# Patient Record
Sex: MALE | Race: WHITE | Employment: OTHER | URBAN - METROPOLITAN AREA
[De-identification: names, ages, dates, MRNs, and addresses within clinical notes are randomized per-mention and may not be internally consistent; named-entity substitution may affect disease eponyms.]

---

## 2017-04-04 PROBLEM — E79.0 HYPERURICEMIA: Status: ACTIVE | Noted: 2017-04-04

## 2017-04-04 PROBLEM — M76.62 ACHILLES TENDONITIS, BILATERAL: Status: ACTIVE | Noted: 2017-04-04

## 2017-04-04 PROBLEM — G60.3 IDIOPATHIC PROGRESSIVE NEUROPATHY: Status: ACTIVE | Noted: 2017-04-04

## 2017-04-04 PROBLEM — M76.61 ACHILLES TENDONITIS, BILATERAL: Status: ACTIVE | Noted: 2017-04-04

## 2017-04-04 PROCEDURE — 82746 ASSAY OF FOLIC ACID SERUM: CPT | Performed by: INTERNAL MEDICINE

## 2017-04-04 PROCEDURE — 82607 VITAMIN B-12: CPT | Performed by: INTERNAL MEDICINE

## 2017-07-19 ENCOUNTER — LAB ENCOUNTER (OUTPATIENT)
Dept: LAB | Age: 82
End: 2017-07-19
Attending: PODIATRIST
Payer: MEDICARE

## 2017-07-19 DIAGNOSIS — E11.65 TYPE 2 DIABETES MELLITUS, UNCONTROLLED (HCC): Primary | ICD-10-CM

## 2017-07-19 PROCEDURE — 83036 HEMOGLOBIN GLYCOSYLATED A1C: CPT

## 2017-07-20 LAB
EST. AVERAGE GLUCOSE BLD GHB EST-MCNC: 114 MG/DL (ref 68–126)
HBA1C MFR BLD HPLC: 5.6 % (ref ?–5.7)

## 2017-08-10 PROBLEM — J43.8 OTHER EMPHYSEMA (HCC): Status: ACTIVE | Noted: 2017-08-10

## 2017-08-10 PROBLEM — J30.1 ACUTE SEASONAL ALLERGIC RHINITIS DUE TO POLLEN: Status: ACTIVE | Noted: 2017-08-10

## 2017-08-10 PROCEDURE — 81003 URINALYSIS AUTO W/O SCOPE: CPT | Performed by: INTERNAL MEDICINE

## 2017-08-18 ENCOUNTER — MYAURORA ACCOUNT LINK (OUTPATIENT)
Dept: OTHER | Age: 82
End: 2017-08-18

## 2017-08-21 ENCOUNTER — LAB REQUISITION (OUTPATIENT)
Dept: LAB | Facility: HOSPITAL | Age: 82
End: 2017-08-21
Payer: MEDICARE

## 2017-08-21 DIAGNOSIS — M21.271 FLEXION DEFORMITY, RIGHT ANKLE AND TOES: ICD-10-CM

## 2017-08-21 DIAGNOSIS — M20.41 OTHER HAMMER TOE(S) (ACQUIRED), RIGHT FOOT: ICD-10-CM

## 2017-08-21 PROCEDURE — 88305 TISSUE EXAM BY PATHOLOGIST: CPT | Performed by: PODIATRIST

## 2017-11-20 PROBLEM — E78.00 PURE HYPERCHOLESTEROLEMIA: Status: ACTIVE | Noted: 2017-11-20

## 2017-11-20 PROBLEM — F32.A MILD DEPRESSIVE DISORDER: Status: ACTIVE | Noted: 2017-11-20

## 2018-12-03 PROCEDURE — 81003 URINALYSIS AUTO W/O SCOPE: CPT | Performed by: INTERNAL MEDICINE

## 2018-12-17 PROBLEM — I65.23 BILATERAL CAROTID ARTERY STENOSIS: Status: ACTIVE | Noted: 2018-12-17

## 2021-01-21 PROBLEM — M76.61 ACHILLES TENDONITIS, BILATERAL: Status: RESOLVED | Noted: 2017-04-04 | Resolved: 2021-01-21

## 2021-01-21 PROBLEM — M76.62 ACHILLES TENDONITIS, BILATERAL: Status: RESOLVED | Noted: 2017-04-04 | Resolved: 2021-01-21

## 2021-08-31 PROBLEM — R26.9 GAIT ABNORMALITY: Status: ACTIVE | Noted: 2021-08-31

## 2021-08-31 PROBLEM — R29.6 FREQUENT FALLS: Status: ACTIVE | Noted: 2021-08-31

## 2022-03-22 PROBLEM — F32.A MILD DEPRESSIVE DISORDER: Status: RESOLVED | Noted: 2017-11-20 | Resolved: 2022-03-22

## 2022-03-22 PROBLEM — F32.0 MAJOR DEPRESSIVE DISORDER, SINGLE EPISODE, MILD (HCC): Status: ACTIVE | Noted: 2022-03-22

## 2022-03-22 PROBLEM — E46 PROTEIN-CALORIE MALNUTRITION, UNSPECIFIED SEVERITY (HCC): Status: ACTIVE | Noted: 2022-03-22

## 2022-03-23 NOTE — PAT NURSING NOTE
Left voicemail message for Kel Aldridge at surgeon office (Dr. Frank Gilbert) to verify that there is a plan in place for 24 hour H&P update prior to procedure tomorrow. Await return call.

## 2022-03-24 ENCOUNTER — ANESTHESIA EVENT (OUTPATIENT)
Dept: SURGERY | Facility: HOSPITAL | Age: 87
End: 2022-03-24
Payer: MEDICARE

## 2022-03-24 ENCOUNTER — HOSPITAL ENCOUNTER (OUTPATIENT)
Facility: HOSPITAL | Age: 87
Setting detail: HOSPITAL OUTPATIENT SURGERY
Discharge: HOME | End: 2022-03-24
Attending: PODIATRIST | Admitting: PODIATRIST
Payer: MEDICARE

## 2022-03-24 ENCOUNTER — APPOINTMENT (OUTPATIENT)
Dept: GENERAL RADIOLOGY | Facility: HOSPITAL | Age: 87
End: 2022-03-24
Attending: PODIATRIST
Payer: MEDICARE

## 2022-03-24 ENCOUNTER — ANESTHESIA (OUTPATIENT)
Dept: SURGERY | Facility: HOSPITAL | Age: 87
End: 2022-03-24
Payer: MEDICARE

## 2022-03-24 VITALS
BODY MASS INDEX: 19.99 KG/M2 | HEIGHT: 69 IN | DIASTOLIC BLOOD PRESSURE: 67 MMHG | SYSTOLIC BLOOD PRESSURE: 128 MMHG | OXYGEN SATURATION: 96 % | HEART RATE: 89 BPM | RESPIRATION RATE: 18 BRPM | TEMPERATURE: 97 F | WEIGHT: 135 LBS

## 2022-03-24 DIAGNOSIS — M86.9 OSTEOMYELITIS OF GREAT TOE OF LEFT FOOT (HCC): ICD-10-CM

## 2022-03-24 DIAGNOSIS — M86.9 OSTEOMYELITIS OF GREAT TOE OF RIGHT FOOT (HCC): ICD-10-CM

## 2022-03-24 LAB — GLUCOSE BLD-MCNC: 94 MG/DL (ref 70–99)

## 2022-03-24 PROCEDURE — 82962 GLUCOSE BLOOD TEST: CPT

## 2022-03-24 PROCEDURE — 87205 SMEAR GRAM STAIN: CPT | Performed by: PODIATRIST

## 2022-03-24 PROCEDURE — 73630 X-RAY EXAM OF FOOT: CPT | Performed by: PODIATRIST

## 2022-03-24 PROCEDURE — 87075 CULTR BACTERIA EXCEPT BLOOD: CPT | Performed by: PODIATRIST

## 2022-03-24 PROCEDURE — 87186 SC STD MICRODIL/AGAR DIL: CPT | Performed by: PODIATRIST

## 2022-03-24 PROCEDURE — 0Y6Q0Z1 DETACHMENT AT LEFT 1ST TOE, HIGH, OPEN APPROACH: ICD-10-PCS | Performed by: PODIATRIST

## 2022-03-24 PROCEDURE — 87070 CULTURE OTHR SPECIMN AEROBIC: CPT | Performed by: PODIATRIST

## 2022-03-24 PROCEDURE — 87077 CULTURE AEROBIC IDENTIFY: CPT | Performed by: PODIATRIST

## 2022-03-24 PROCEDURE — 87176 TISSUE HOMOGENIZATION CULTR: CPT | Performed by: PODIATRIST

## 2022-03-24 PROCEDURE — 88311 DECALCIFY TISSUE: CPT | Performed by: PODIATRIST

## 2022-03-24 PROCEDURE — 0Y6P0Z1 DETACHMENT AT RIGHT 1ST TOE, HIGH, OPEN APPROACH: ICD-10-PCS | Performed by: PODIATRIST

## 2022-03-24 PROCEDURE — 88305 TISSUE EXAM BY PATHOLOGIST: CPT | Performed by: PODIATRIST

## 2022-03-24 RX ORDER — METOCLOPRAMIDE HYDROCHLORIDE 5 MG/ML
10 INJECTION INTRAMUSCULAR; INTRAVENOUS AS NEEDED
Status: DISCONTINUED | OUTPATIENT
Start: 2022-03-24 | End: 2022-03-24

## 2022-03-24 RX ORDER — SULFAMETHOXAZOLE AND TRIMETHOPRIM 800; 160 MG/1; MG/1
1 TABLET ORAL 2 TIMES DAILY
Qty: 20 TABLET | Refills: 0 | Status: SHIPPED | OUTPATIENT
Start: 2022-03-24 | End: 2022-04-03

## 2022-03-24 RX ORDER — ACETAMINOPHEN 500 MG
1000 TABLET ORAL ONCE
Status: DISCONTINUED | OUTPATIENT
Start: 2022-03-24 | End: 2022-03-24 | Stop reason: HOSPADM

## 2022-03-24 RX ORDER — BUPIVACAINE HYDROCHLORIDE 5 MG/ML
INJECTION, SOLUTION EPIDURAL; INTRACAUDAL AS NEEDED
Status: DISCONTINUED | OUTPATIENT
Start: 2022-03-24 | End: 2022-03-24 | Stop reason: HOSPADM

## 2022-03-24 RX ORDER — IBUPROFEN 200 MG
600 TABLET ORAL ONCE AS NEEDED
Status: DISCONTINUED | OUTPATIENT
Start: 2022-03-24 | End: 2022-03-24

## 2022-03-24 RX ORDER — LIDOCAINE HYDROCHLORIDE 10 MG/ML
INJECTION, SOLUTION INFILTRATION; PERINEURAL AS NEEDED
Status: DISCONTINUED | OUTPATIENT
Start: 2022-03-24 | End: 2022-03-24 | Stop reason: HOSPADM

## 2022-03-24 RX ORDER — PHENYLEPHRINE HCL 10 MG/ML
VIAL (ML) INJECTION AS NEEDED
Status: DISCONTINUED | OUTPATIENT
Start: 2022-03-24 | End: 2022-03-24 | Stop reason: SURG

## 2022-03-24 RX ORDER — VANCOMYCIN HYDROCHLORIDE 1 G/20ML
INJECTION, POWDER, LYOPHILIZED, FOR SOLUTION INTRAVENOUS
Status: DISCONTINUED
Start: 2022-03-24 | End: 2022-03-24

## 2022-03-24 RX ORDER — HYDROCODONE BITARTRATE AND ACETAMINOPHEN 5; 325 MG/1; MG/1
1 TABLET ORAL AS NEEDED
Status: DISCONTINUED | OUTPATIENT
Start: 2022-03-24 | End: 2022-03-24

## 2022-03-24 RX ORDER — SODIUM CHLORIDE, SODIUM LACTATE, POTASSIUM CHLORIDE, CALCIUM CHLORIDE 600; 310; 30; 20 MG/100ML; MG/100ML; MG/100ML; MG/100ML
INJECTION, SOLUTION INTRAVENOUS CONTINUOUS
Status: DISCONTINUED | OUTPATIENT
Start: 2022-03-24 | End: 2022-03-24

## 2022-03-24 RX ORDER — HYDROCODONE BITARTRATE AND ACETAMINOPHEN 5; 325 MG/1; MG/1
2 TABLET ORAL AS NEEDED
Status: DISCONTINUED | OUTPATIENT
Start: 2022-03-24 | End: 2022-03-24

## 2022-03-24 RX ORDER — CEFAZOLIN SODIUM/WATER 2 G/20 ML
2 SYRINGE (ML) INTRAVENOUS ONCE
Status: COMPLETED | OUTPATIENT
Start: 2022-03-24 | End: 2022-03-24

## 2022-03-24 RX ORDER — NALOXONE HYDROCHLORIDE 0.4 MG/ML
80 INJECTION, SOLUTION INTRAMUSCULAR; INTRAVENOUS; SUBCUTANEOUS AS NEEDED
Status: DISCONTINUED | OUTPATIENT
Start: 2022-03-24 | End: 2022-03-24

## 2022-03-24 RX ORDER — HYDROMORPHONE HYDROCHLORIDE 1 MG/ML
0.4 INJECTION, SOLUTION INTRAMUSCULAR; INTRAVENOUS; SUBCUTANEOUS EVERY 5 MIN PRN
Status: DISCONTINUED | OUTPATIENT
Start: 2022-03-24 | End: 2022-03-24

## 2022-03-24 RX ORDER — HYDROCODONE BITARTRATE AND ACETAMINOPHEN 5; 325 MG/1; MG/1
1-2 TABLET ORAL EVERY 6 HOURS PRN
Qty: 30 TABLET | Refills: 0 | Status: SHIPPED | OUTPATIENT
Start: 2022-03-24

## 2022-03-24 RX ORDER — ONDANSETRON 2 MG/ML
4 INJECTION INTRAMUSCULAR; INTRAVENOUS AS NEEDED
Status: DISCONTINUED | OUTPATIENT
Start: 2022-03-24 | End: 2022-03-24

## 2022-03-24 RX ORDER — BUPIVACAINE HYDROCHLORIDE 2.5 MG/ML
INJECTION, SOLUTION EPIDURAL; INFILTRATION; INTRACAUDAL AS NEEDED
Status: DISCONTINUED | OUTPATIENT
Start: 2022-03-24 | End: 2022-03-24 | Stop reason: HOSPADM

## 2022-03-24 RX ORDER — DEXAMETHASONE SODIUM PHOSPHATE 4 MG/ML
4 VIAL (ML) INJECTION AS NEEDED
Status: DISCONTINUED | OUTPATIENT
Start: 2022-03-24 | End: 2022-03-24

## 2022-03-24 RX ADMIN — SODIUM CHLORIDE, SODIUM LACTATE, POTASSIUM CHLORIDE, CALCIUM CHLORIDE: 600; 310; 30; 20 INJECTION, SOLUTION INTRAVENOUS at 15:11:00

## 2022-03-24 RX ADMIN — CEFAZOLIN SODIUM/WATER 2 G: 2 G/20 ML SYRINGE (ML) INTRAVENOUS at 13:37:00

## 2022-03-24 RX ADMIN — PHENYLEPHRINE HCL 40 MCG: 10 MG/ML VIAL (ML) INJECTION at 13:43:00

## 2022-03-24 RX ADMIN — SODIUM CHLORIDE, SODIUM LACTATE, POTASSIUM CHLORIDE, CALCIUM CHLORIDE: 600; 310; 30; 20 INJECTION, SOLUTION INTRAVENOUS at 13:37:00

## 2022-03-24 NOTE — INTERVAL H&P NOTE
Pre-op Diagnosis: Osteomyelitis of great toe of right foot ([address] [M86.9]  Osteomyelitis of great toe of left foot ([address] [M86.9]    The above referenced H&P was reviewed by Harrison Al DPM on 3/24/2022, the patient was examined and no significant changes have occurred in the patient's condition since the H&P was performed. I discussed with the patient and/or legal representative the potential benefits, risks and side effects of this procedure; the likelihood of the patient achieving goals; and potential problems that might occur during recuperation. I discussed reasonable alternatives to the procedure, including risks, benefits and side effects related to the alternatives and risks related to not receiving this procedure. We will proceed with procedure as planned.

## 2022-03-24 NOTE — ANESTHESIA POSTPROCEDURE EVALUATION
[address] Patient Status:  Hospital Outpatient Surgery   Age/Gender 80year old adult MRN [id number]   [facility] SURGERY Attending Yue Joe, [address] Day # 0 PCP Erma Gee MD       Anesthesia Post-op Note    AMPUTATION BILATERAL GREAT TOE    Procedure Summary     Date: 03/24/22 Room / Location: [facility] MAIN OR 03 / 1404 [facility] MAIN OR    Anesthesia Start: 4885 Anesthesia Stop:     Procedure: AMPUTATION BILATERAL GREAT TOE (Bilateral Toe) Diagnosis:       Osteomyelitis of great toe of right foot ([address]      Osteomyelitis of great toe of left foot ([facility])      (Osteomyelitis of great toe of right foot ([address]]Osteomyelitis of great toe of left foot ([address] [M86.9])    Surgeons: Yue Joe DPM Anesthesiologist: Fina Rodriguez MD    Anesthesia Type: MAC ASA Status: 3          Anesthesia Type: MAC    Vitals Value Taken Time   /73 03/24/22 1506   Temp 97.6 03/24/22 1511   Pulse 89 03/24/22 1509   Resp 18 03/24/22 1509   SpO2 96 % 03/24/22 1509   Vitals shown include unvalidated device data. Patient Location: PACU    Anesthesia Type: MAC    Airway Patency: patent    Postop Pain Control: adequate    Nausea/Vomiting: none    Cardiopulmonary/Hydration status: stable euvolemic    Complications: no apparent anesthesia related complications    Postop vital signs: stable    Dental Exam: Unchanged from Preop    Patient to be discharged from PACU when criteria met.

## 2022-03-25 NOTE — BRIEF OP NOTE
Pre-Operative Diagnosis: Osteomyelitis of great toe of right foot ([facility]) [M86.9]  Osteomyelitis of great toe of left foot ([address] [M86.9]     Post-Operative Diagnosis: Osteomyelitis of great toe of right foot ([address] [R29. 9]Osteomyelitis of great toe of left foot ([address] [M86.9]      Procedure Performed:   AMPUTATION BILATERAL GREAT TOE    Surgeon(s) and Role:     Sahra Starkey DPM - Primary    Assistant(s):   n/a     Surgical Findings:      Specimen:  Aerobic and anaerobic cultures of both soft tissue and bone of both right and left great toe, osteomyelitis of right and left great toe sent to gross pathology,  Right and left great toes sent to gross pathology, gouty tophi bilateral 1st MPJs sent to gross pathology     Estimated Blood Loss: Blood Output: 2 mL (3/24/2022  2:53 PM)      Dictation Number:      Cece Salazar DPM  3/25/2022  9:28 AM

## 2022-03-25 NOTE — OPERATIVE REPORT
[address]    PATIENT'S NAME: Sylvie Thorne   ATTENDING PHYSICIAN: Richelle Caicedo. Tracey Bacon D.P.M. OPERATING PHYSICIAN: Richelle Caicedo. Tracey Bacon D.P.M. PATIENT ACCOUNT#:   [de-identified]    [facility]  MEDICAL RECORD #:   [id number]       YOB: 1934  ADMISSION DATE:       03/24/2022      OPERATION DATE:  03/24/2022    OPERATIVE REPORT      PREOPERATIVE DIAGNOSIS:    1.   Osteomyelitis of great toe of right foot. 2.   Osteomyelitis of great toe of left foot. POSTOPERATIVE DIAGNOSIS:    1.   Osteomyelitis of great toe of right foot. 2.   Osteomyelitis of great toe of left foot. PROCEDURE:    1. Amputation of right great toe. 2.   Amputation of left great toe. ANESTHESIA:  MAC with local.    HEMOSTASIS:  Pneumatic ankle tourniquet at 250 mmHg, bilateral ankles. ESTIMATED BLOOD LOSS:  2 mL. INJECTABLES:  0.5% Marcaine plain and 0.25% Marcaine plain. COMPLICATIONS:  None. SPECIMENS:  Aerobic and anaerobic cultures of both soft tissue and bone from the right and left great toe were sent, osteomyelitis of IPJ of both right and left great toes sent to gross pathology, the right and left toes were sent to gross pathology as well, gouty tophi from bilateral first MPJ was also sent to gross pathology. OPERATIVE TECHNIQUE:  The patient was brought into the operating room and placed on the operating room table in a supine position. Following adequate IV sedation, and infiltrative block was performed about bilateral foot consisting of 1% lidocaine plain. Both feet were then scrubbed, prepped and draped in a usual aseptic manner. A pneumatic ankle tourniquet was then inflated to 250 mmHg bilateral ankles. Attention was then directed to bilateral great toe. Large wound was noted overlying the dorsal aspect of bilateral great toe. Both wound bases were necrotic.   There was exposure of both the base of the distal phalanx and the head of the proximal phalanx in both wound bases, bilateral great toe. Next, 2 semi-elliptical fishmouth incisions were made about the base of bilateral great toe. The incisions were made directly to bone. Dissection was carried down to the level of the first MPJ. The right and left great toe were then disarticulated and removed completely. Significant gouty tophi were noted about first MPJ bilaterally. Gouty tophi specimens were excised and sent to gross pathology. Sharp dissection was used to remove further gouty tophi from both incision sites in order to allow for adequate incision closure and healing. Specimens were then obtained from bilateral great toe. Both aerobic and anaerobic cultures were obtained of both soft tissue and bone from the IPJ of bilateral hallux. Next, bone was removed from the IPJ of bilateral hallux and sent to gross pathology as osteomyelitis. Bilateral great toes were then also sent to gross pathology. The incision site of all bilateral first MPJ were then copiously flushed with sterile normal saline. The area was examined and any abnormal tissue was removed. There was no purulence and no necrosis remaining at this sites. Electrocautery was then performed as necessary. Both skin incisions were then closed utilizing 4-0 nylon in a horizontal mattress suture technique. A portion of both incision sites were left open and packed with 1/4-inch iodoform packing gauze. Bilateral foot was then dressed with sterile 4 x 4 gauze and Kerlix, and an Ace wrap was applied about the foot. The tourniquet was deflated to bilateral foot and immediate hyperemia was noted to all remaining digits. The patient was taken to the recovery room in good condition with vital signs stable and neurovascular status intact to all remaining digits. The patient was given both verbal and written postoperative instruction and medication as needed for postoperative pain control. The patient was also placed on oral Bactrim.   There was some erythema noted about the right dorsal foot, but there was no ascending cellulitis. The patient was afebrile and patient was able to be discharged home. The patient was advised to follow up with Dr. Rosalina Bacon postoperatively or her partners in her absence. Dictated By Gabriela Bacon D.P.M.  d: 03/25/2022 09:53:44  t: 03/25/2022 11:16:37  Job [id number]  JEA/

## (undated) DEVICE — CONVERTORS STOCKINETTE: Brand: CONVERTORS

## (undated) DEVICE — COVER,MAYO STAND,STERILE: Brand: MEDLINE

## (undated) DEVICE — SUTURE ETHILON 4-0 PS-2

## (undated) DEVICE — STERILE POLYISOPRENE POWDER-FREE SURGICAL GLOVES: Brand: PROTEXIS

## (undated) DEVICE — PRECISION (5.5 X 0.51 X 18.0MM)

## (undated) DEVICE — PREMIUM WET SKIN PREP TRAY: Brand: MEDLINE INDUSTRIES, INC.

## (undated) DEVICE — #15 STERILE STAINLESS BLADE: Brand: STERILE STAINLESS BLADES

## (undated) DEVICE — SPECIMEN CONTAINER,POSITIVE SEAL INDICATOR, OR PACKAGED: Brand: PRECISION

## (undated) DEVICE — MARKER SKIN 2 TIP

## (undated) DEVICE — DRAPE LIMB BILATERAL DYNJP8004

## (undated) DEVICE — LOWER EXTREMITY CDS-LF: Brand: MEDLINE INDUSTRIES, INC.

## (undated) DEVICE — Device

## (undated) DEVICE — DISPOSABLE TOURNIQUET CUFF SINGLE BLADDER, DUAL PORT AND QUICK CONNECT CONNECTOR: Brand: COLOR CUFF

## (undated) NOTE — LETTER
OUTSIDE TESTING RESULT REQUEST     IMPORTANT: FOR YOUR IMMEDIATE ATTENTION  Please FAX all test results listed below to: [phone]     Testing already done on or about: done with you on 3/22/222     * * * * If testing is NOT complete, arrange with patient A.S.A.P. * * * *      Patient Name: Rambo Ya  Surgery Date: 3/24/2022  CSN: [id number]  Medical Record: [id number]   : 1934 - A: 80 y      Sex: male  Surgeon(s):  Arpita Astorga  Procedure: AMPUTATION BILATERAL GREAT TOE  Anesthesia Type: MAC     Surgeon:  Juanita Marie DPM     The following Testing and Time Line are REQUIRED PER ANESTHESIA     EKG READ AND SIGNED WITHIN   90 days      Thank Federico Crenshaw by:Yudelka Garrido  5/13/15